# Patient Record
Sex: FEMALE | Race: WHITE
[De-identification: names, ages, dates, MRNs, and addresses within clinical notes are randomized per-mention and may not be internally consistent; named-entity substitution may affect disease eponyms.]

---

## 2017-01-23 ENCOUNTER — HOSPITAL ENCOUNTER (OUTPATIENT)
Dept: HOSPITAL 45 - C.LABPVFM | Age: 77
Discharge: HOME | End: 2017-01-23
Attending: GENERAL PRACTICE
Payer: COMMERCIAL

## 2017-01-23 DIAGNOSIS — R73.09: Primary | ICD-10-CM

## 2017-01-23 DIAGNOSIS — I10: ICD-10-CM

## 2017-01-23 DIAGNOSIS — M25.569: ICD-10-CM

## 2017-01-23 DIAGNOSIS — E78.5: ICD-10-CM

## 2017-01-23 LAB
ANION GAP SERPL CALC-SCNC: 9 MMOL/L (ref 3–11)
BUN SERPL-MCNC: 19 MG/DL (ref 7–18)
BUN/CREAT SERPL: 20.3 (ref 10–20)
CALCIUM SERPL-MCNC: 9.2 MG/DL (ref 8.5–10.1)
CHLORIDE SERPL-SCNC: 101 MMOL/L (ref 98–107)
CHOLEST/HDLC SERPL: 3.8 {RATIO}
CO2 SERPL-SCNC: 30 MMOL/L (ref 21–32)
CREAT SERPL-MCNC: 0.92 MG/DL (ref 0.6–1.2)
GLUCOSE SERPL-MCNC: 153 MG/DL (ref 70–99)
GLUCOSE UR QL: 46 MG/DL
KETONES UR QL STRIP: 95 MG/DL
NITRITE UR QL STRIP: 159 MG/DL (ref 0–150)
PH UR: 173 MG/DL (ref 0–200)
POTASSIUM SERPL-SCNC: 3.4 MMOL/L (ref 3.5–5.1)
SODIUM SERPL-SCNC: 140 MMOL/L (ref 136–145)
URATE SERPL-MCNC: 5.7 MG/DL (ref 2.6–7.2)
VERY LOW DENSITY LIPOPROT CALC: 32 MG/DL

## 2017-01-24 LAB — EST. AVERAGE GLUCOSE BLD GHB EST-MCNC: 143 MG/DL

## 2017-04-05 ENCOUNTER — HOSPITAL ENCOUNTER (OUTPATIENT)
Dept: HOSPITAL 45 - C.LABPVFM | Age: 77
Discharge: HOME | End: 2017-04-05
Attending: GENERAL PRACTICE
Payer: COMMERCIAL

## 2017-04-05 DIAGNOSIS — R73.09: ICD-10-CM

## 2017-04-05 DIAGNOSIS — I10: Primary | ICD-10-CM

## 2017-04-05 LAB
ANION GAP SERPL CALC-SCNC: 5 MMOL/L (ref 3–11)
BUN SERPL-MCNC: 16 MG/DL (ref 7–18)
BUN/CREAT SERPL: 18.2 (ref 10–20)
CALCIUM SERPL-MCNC: 9 MG/DL (ref 8.5–10.1)
CHLORIDE SERPL-SCNC: 105 MMOL/L (ref 98–107)
CO2 SERPL-SCNC: 32 MMOL/L (ref 21–32)
CREAT SERPL-MCNC: 0.89 MG/DL (ref 0.6–1.2)
EST. AVERAGE GLUCOSE BLD GHB EST-MCNC: 157 MG/DL
GLUCOSE SERPL-MCNC: 165 MG/DL (ref 70–99)
POTASSIUM SERPL-SCNC: 3.5 MMOL/L (ref 3.5–5.1)
SODIUM SERPL-SCNC: 142 MMOL/L (ref 136–145)

## 2017-07-05 ENCOUNTER — HOSPITAL ENCOUNTER (OUTPATIENT)
Dept: HOSPITAL 45 - C.LABBFT | Age: 77
Discharge: HOME | End: 2017-07-05
Attending: NURSE PRACTITIONER
Payer: COMMERCIAL

## 2017-07-05 DIAGNOSIS — E03.9: Primary | ICD-10-CM

## 2017-07-05 DIAGNOSIS — Z85.828: ICD-10-CM

## 2017-07-05 DIAGNOSIS — E11.9: ICD-10-CM

## 2017-07-05 DIAGNOSIS — E55.9: ICD-10-CM

## 2017-07-06 LAB — EST. AVERAGE GLUCOSE BLD GHB EST-MCNC: 143 MG/DL

## 2017-07-11 NOTE — CODING QUERY MEDICAL NECESSITY
CQSUPPORTING DIAGNOSIS NEEDED





A supporting diagnosis is required for the test/procedure performed on this patient in 
order for us to be reimbursed by the patient's insurance. Please provide a supporting 
diagnosis for the following test/procedure listed below next to the test name along with 
your signature. 



*If there is no additional diagnosis for this patient that would support the following 
test/procedure please document that below next to the test/procedure.



Test(s)/Procedure(s) that require a supporting diagnosis:





JOHN   07/05/17       VITAMIN D TEST 





Provider Signature:  ______________________________  Date:  _______



Thank you  

Ana Calderon

Health Information Management

Phone:  [phone]

Fax:  [phone]



Once completed, please kindly fax back to [phone]



For questions please call [phone]

## 2017-08-31 ENCOUNTER — HOSPITAL ENCOUNTER (OUTPATIENT)
Dept: HOSPITAL 45 - C.MAMM | Age: 77
Discharge: HOME | End: 2017-08-31
Attending: GENERAL PRACTICE
Payer: COMMERCIAL

## 2017-08-31 DIAGNOSIS — Z12.31: Primary | ICD-10-CM

## 2017-09-05 ENCOUNTER — HOSPITAL ENCOUNTER (OUTPATIENT)
Dept: HOSPITAL 45 - C.LABPVFM | Age: 77
Discharge: HOME | End: 2017-09-05
Attending: GENERAL PRACTICE
Payer: COMMERCIAL

## 2017-09-05 DIAGNOSIS — I10: Primary | ICD-10-CM

## 2017-09-05 LAB
ANION GAP SERPL CALC-SCNC: 7 MMOL/L (ref 3–11)
BUN SERPL-MCNC: 14 MG/DL (ref 7–18)
BUN/CREAT SERPL: 17.6 (ref 10–20)
CALCIUM SERPL-MCNC: 9.2 MG/DL (ref 8.5–10.1)
CHLORIDE SERPL-SCNC: 104 MMOL/L (ref 98–107)
CO2 SERPL-SCNC: 29 MMOL/L (ref 21–32)
CREAT SERPL-MCNC: 0.79 MG/DL (ref 0.6–1.2)
GLUCOSE SERPL-MCNC: 104 MG/DL (ref 70–99)
POTASSIUM SERPL-SCNC: 3.7 MMOL/L (ref 3.5–5.1)
SODIUM SERPL-SCNC: 140 MMOL/L (ref 136–145)

## 2017-09-05 NOTE — MAMMOGRAPHY REPORT
BILATERAL DIGITAL SCREENING MAMMOGRAM TOMOSYNTHESIS WITH CAD: 8/31/2017

CLINICAL HISTORY: Asymptomatic. Personal history of breast cancer.  





TECHNIQUE:  Breast tomosynthesis in addition to standard 2D mammography was performed. Current study 
was also evaluated with a Computer Aided Detection (CAD) system.  



COMPARISON: Comparison is made to exams dated:  8/30/2016 mammogram, 7/2/2015 mammogram, 6/30/2014 ma
mmogram, 6/28/2013 mammogram, 12/18/2012 mammogram, and 7/2/2012 mammogram - [facility]
nter.   



BREAST COMPOSITION:  There are scattered areas of fibroglandular density in both breasts.  



FINDINGS:  No suspicious masses, calcifications, or areas of architectural distortion are noted in ei
ther breast. There has been no significant interval change compared to prior exams.  There are stable
 postsurgical changes in the right upper outer quadrant and left subareolar breast.  Bilateral asymme
tries and benign-appearing calcifications are not significantly changed.





IMPRESSION:  ACR BI-RADS CATEGORY 2: BENIGN

There is no mammographic evidence of malignancy. A 1 year screening mammogram is recommended.  The pa
tient will receive written notification of the results.  





Approximately 10% of breast cancers are not detected with mammography. A negative mammographic report
 should not delay biopsy if a clinically suggestive mass is present.



Janett Alberto M.D.          

ah/:9/1/2017 16:30:56  



Imaging Technologist: Ashleigh GATICA)(JOSE GUADALUPE), [facility]

letter sent: Normal 1/2  

BI-RADS Code: ACR BI-RADS Category 2: Benign

## 2018-03-08 ENCOUNTER — HOSPITAL ENCOUNTER (OUTPATIENT)
Dept: HOSPITAL 45 - C.LABBFT | Age: 78
Discharge: HOME | End: 2018-03-08
Attending: NURSE PRACTITIONER
Payer: COMMERCIAL

## 2018-03-08 DIAGNOSIS — E11.9: ICD-10-CM

## 2018-03-08 DIAGNOSIS — E03.9: Primary | ICD-10-CM

## 2018-03-08 DIAGNOSIS — E55.9: ICD-10-CM

## 2018-03-08 DIAGNOSIS — E78.5: ICD-10-CM

## 2018-03-08 DIAGNOSIS — I10: ICD-10-CM

## 2018-03-08 LAB
ALBUMIN SERPL-MCNC: 3.6 GM/DL (ref 3.4–5)
ALP SERPL-CCNC: 66 U/L (ref 45–117)
ALT SERPL-CCNC: 21 U/L (ref 12–78)
AST SERPL-CCNC: 16 U/L (ref 15–37)
BUN SERPL-MCNC: 16 MG/DL (ref 7–18)
CALCIUM SERPL-MCNC: 8.7 MG/DL (ref 8.5–10.1)
CO2 SERPL-SCNC: 29 MMOL/L (ref 21–32)
CREAT SERPL-MCNC: 0.9 MG/DL (ref 0.6–1.2)
CREAT UR-MCNC: 168 MG/DL
GLUCOSE SERPL-MCNC: 91 MG/DL (ref 70–99)
HBA1C MFR BLD: 5.9 % (ref 4.5–5.6)
KETONES UR QL STRIP: 81 MG/DL
PH UR: 153 MG/DL (ref 0–200)
POTASSIUM SERPL-SCNC: 3.5 MMOL/L (ref 3.5–5.1)
PROT SERPL-MCNC: 7.2 GM/DL (ref 6.4–8.2)
SODIUM SERPL-SCNC: 140 MMOL/L (ref 136–145)